# Patient Record
Sex: FEMALE | Race: WHITE | NOT HISPANIC OR LATINO
[De-identification: names, ages, dates, MRNs, and addresses within clinical notes are randomized per-mention and may not be internally consistent; named-entity substitution may affect disease eponyms.]

---

## 2017-05-19 ENCOUNTER — LABORATORY RESULT (OUTPATIENT)
Age: 57
End: 2017-05-19

## 2017-05-19 ENCOUNTER — APPOINTMENT (OUTPATIENT)
Dept: OBGYN | Facility: CLINIC | Age: 57
End: 2017-05-19

## 2017-05-19 VITALS
HEIGHT: 60 IN | DIASTOLIC BLOOD PRESSURE: 83 MMHG | SYSTOLIC BLOOD PRESSURE: 129 MMHG | BODY MASS INDEX: 56.15 KG/M2 | WEIGHT: 286 LBS

## 2017-05-29 ENCOUNTER — MOBILE ON CALL (OUTPATIENT)
Age: 57
End: 2017-05-29

## 2017-05-30 ENCOUNTER — MOBILE ON CALL (OUTPATIENT)
Age: 57
End: 2017-05-30

## 2017-05-31 ENCOUNTER — MESSAGE (OUTPATIENT)
Age: 57
End: 2017-05-31

## 2017-05-31 LAB — CYTOLOGY CVX/VAG DOC THIN PREP: NORMAL

## 2018-05-16 ENCOUNTER — APPOINTMENT (OUTPATIENT)
Dept: OBGYN | Facility: CLINIC | Age: 58
End: 2018-05-16
Payer: COMMERCIAL

## 2018-05-16 VITALS
SYSTOLIC BLOOD PRESSURE: 130 MMHG | BODY MASS INDEX: 50.85 KG/M2 | DIASTOLIC BLOOD PRESSURE: 84 MMHG | WEIGHT: 259 LBS | HEIGHT: 60 IN

## 2018-05-16 PROCEDURE — 99396 PREV VISIT EST AGE 40-64: CPT

## 2018-05-18 ENCOUNTER — MESSAGE (OUTPATIENT)
Age: 58
End: 2018-05-18

## 2018-05-18 LAB — HPV HIGH+LOW RISK DNA PNL CVX: NOT DETECTED

## 2018-05-21 ENCOUNTER — MESSAGE (OUTPATIENT)
Age: 58
End: 2018-05-21

## 2018-05-21 LAB — CYTOLOGY CVX/VAG DOC THIN PREP: NORMAL

## 2018-07-27 ENCOUNTER — MESSAGE (OUTPATIENT)
Age: 58
End: 2018-07-27

## 2018-07-27 RX ORDER — LEVOTHYROXINE SODIUM 0.12 MG/1
125 TABLET ORAL DAILY
Qty: 90 | Refills: 0 | Status: ACTIVE | COMMUNITY
Start: 2018-07-27 | End: 1900-01-01

## 2019-05-22 ENCOUNTER — APPOINTMENT (OUTPATIENT)
Dept: OBGYN | Facility: CLINIC | Age: 59
End: 2019-05-22
Payer: COMMERCIAL

## 2019-05-22 VITALS
BODY MASS INDEX: 48.1 KG/M2 | DIASTOLIC BLOOD PRESSURE: 82 MMHG | SYSTOLIC BLOOD PRESSURE: 130 MMHG | WEIGHT: 245 LBS | HEIGHT: 60 IN

## 2019-05-22 PROCEDURE — 82270 OCCULT BLOOD FECES: CPT

## 2019-05-22 PROCEDURE — 99396 PREV VISIT EST AGE 40-64: CPT

## 2019-05-22 NOTE — PHYSICAL EXAM
[Awake] : awake [Alert] : alert [Acute Distress] : no acute distress [Thyroid Nodule] : no thyroid nodule [Goiter] : no goiter [Mass] : no breast mass [Nipple Discharge] : no nipple discharge [Axillary LAD] : no axillary lymphadenopathy [Soft] : soft [Tender] : non tender [Oriented x3] : oriented to person, place, and time [Normal] : vagina [No Bleeding] : there was no active vaginal bleeding [Absent] : absent [Adnexa Absent] : absent bilaterally [No Tenderness] : no rectal tenderness [Occult Blood] : occult blood test from digital rectal exam was negative [Nl Sphincter Tone] : normal sphincter tone [RRR, No Murmurs] : RRR, no murmurs [CTAB] : CTAB

## 2019-05-24 ENCOUNTER — MESSAGE (OUTPATIENT)
Age: 59
End: 2019-05-24

## 2019-05-24 LAB — HPV HIGH+LOW RISK DNA PNL CVX: NOT DETECTED

## 2019-05-28 ENCOUNTER — MESSAGE (OUTPATIENT)
Age: 59
End: 2019-05-28

## 2019-05-28 ENCOUNTER — TRANSCRIPTION ENCOUNTER (OUTPATIENT)
Age: 59
End: 2019-05-28

## 2019-05-28 LAB — CYTOLOGY CVX/VAG DOC THIN PREP: NORMAL

## 2020-06-03 ENCOUNTER — APPOINTMENT (OUTPATIENT)
Dept: OBGYN | Facility: CLINIC | Age: 60
End: 2020-06-03
Payer: COMMERCIAL

## 2020-06-03 VITALS
WEIGHT: 276 LBS | BODY MASS INDEX: 54.19 KG/M2 | SYSTOLIC BLOOD PRESSURE: 132 MMHG | HEIGHT: 60 IN | DIASTOLIC BLOOD PRESSURE: 82 MMHG

## 2020-06-03 PROCEDURE — 99396 PREV VISIT EST AGE 40-64: CPT

## 2020-06-03 PROCEDURE — 82270 OCCULT BLOOD FECES: CPT

## 2020-06-03 NOTE — PHYSICAL EXAM
[Awake] : awake [Alert] : alert [Acute Distress] : no acute distress [Thyroid Nodule] : no thyroid nodule [Goiter] : no goiter [Mass] : no breast mass [Nipple Discharge] : no nipple discharge [Axillary LAD] : no axillary lymphadenopathy [Soft] : soft [Tender] : non tender [Oriented x3] : oriented to person, place, and time [Normal] : urethra [Atrophy] : atrophy [Dry Mucosa] : dry mucosa [Absent] : was absent [No Bleeding] : there was no active vaginal bleeding [Adnexa Absent] : absent bilaterally [Occult Blood] : occult blood test from digital rectal exam was negative [No Tenderness] : no rectal tenderness [Nl Sphincter Tone] : normal sphincter tone [External Hemorrhoid] : an external hemorrhoid

## 2020-06-05 ENCOUNTER — MESSAGE (OUTPATIENT)
Age: 60
End: 2020-06-05

## 2020-06-05 LAB
CYTOLOGY CVX/VAG DOC THIN PREP: NORMAL
HPV HIGH+LOW RISK DNA PNL CVX: NOT DETECTED

## 2020-08-08 DIAGNOSIS — Z80.0 FAMILY HISTORY OF MALIGNANT NEOPLASM OF DIGESTIVE ORGANS: ICD-10-CM

## 2020-08-08 DIAGNOSIS — E06.3 AUTOIMMUNE THYROIDITIS: ICD-10-CM

## 2020-08-08 DIAGNOSIS — I10 ESSENTIAL (PRIMARY) HYPERTENSION: ICD-10-CM

## 2020-08-08 DIAGNOSIS — Z90.89 ACQUIRED ABSENCE OF OTHER ORGANS: ICD-10-CM

## 2020-08-08 DIAGNOSIS — R05 COUGH: ICD-10-CM

## 2020-08-08 DIAGNOSIS — E03.2 HYPOTHYROIDISM DUE TO MEDICAMENTS AND OTHER EXOGENOUS SUBSTANCES: ICD-10-CM

## 2020-08-08 DIAGNOSIS — Z98.891 HISTORY OF UTERINE SCAR FROM PREVIOUS SURGERY: ICD-10-CM

## 2020-08-08 DIAGNOSIS — Z90.710 ACQUIRED ABSENCE OF BOTH CERVIX AND UTERUS: ICD-10-CM

## 2020-08-08 DIAGNOSIS — Z82.49 FAMILY HISTORY OF ISCHEMIC HEART DISEASE AND OTHER DISEASES OF THE CIRCULATORY SYSTEM: ICD-10-CM

## 2021-10-15 ENCOUNTER — NON-APPOINTMENT (OUTPATIENT)
Age: 61
End: 2021-10-15

## 2021-10-20 ENCOUNTER — NON-APPOINTMENT (OUTPATIENT)
Age: 61
End: 2021-10-20

## 2021-12-20 ENCOUNTER — APPOINTMENT (OUTPATIENT)
Dept: OBGYN | Facility: CLINIC | Age: 61
End: 2021-12-20

## 2022-02-12 ENCOUNTER — NON-APPOINTMENT (OUTPATIENT)
Age: 62
End: 2022-02-12

## 2022-02-15 ENCOUNTER — APPOINTMENT (OUTPATIENT)
Dept: OBGYN | Facility: CLINIC | Age: 62
End: 2022-02-15
Payer: COMMERCIAL

## 2022-02-15 VITALS
WEIGHT: 293 LBS | BODY MASS INDEX: 57.52 KG/M2 | DIASTOLIC BLOOD PRESSURE: 84 MMHG | SYSTOLIC BLOOD PRESSURE: 132 MMHG | HEIGHT: 60 IN

## 2022-02-15 DIAGNOSIS — Z12.11 ENCOUNTER FOR SCREENING FOR MALIGNANT NEOPLASM OF COLON: ICD-10-CM

## 2022-02-15 DIAGNOSIS — Z01.419 ENCOUNTER FOR GYNECOLOGICAL EXAMINATION (GENERAL) (ROUTINE) W/OUT ABNORMAL FINDINGS: ICD-10-CM

## 2022-02-15 DIAGNOSIS — Z12.12 ENCOUNTER FOR SCREENING FOR MALIGNANT NEOPLASM OF COLON: ICD-10-CM

## 2022-02-15 PROCEDURE — 99396 PREV VISIT EST AGE 40-64: CPT

## 2022-02-15 PROCEDURE — 82270 OCCULT BLOOD FECES: CPT

## 2022-02-15 NOTE — PHYSICAL EXAM
[Examination Of The Breasts] : a normal appearance [No Masses] : no breast masses were palpable [Labia Majora] : normal [Labia Minora] : normal [Normal] : normal [Absent] : absent [Uterine Adnexae] : absent [No Tenderness] : no tenderness [Nl Sphincter Tone] : normal sphincter tone [External Hemorrhoid] : external hemorrhoid [FreeTextEntry9] : stool HO neg

## 2022-02-15 NOTE — DISCUSSION/SUMMARY
[FreeTextEntry1] : 62yo without gyn complaint\par Plan:\par 1.Mammo\par 2.BMD\par 3.Colonosocpy per routine

## 2022-02-15 NOTE — HISTORY OF PRESENT ILLNESS
[FreeTextEntry1] : 60yo,  with gyn history significant for:\par 1.LH/BSO:Stage 1,Grade 1 endometrial ca\par LMP 2015

## 2022-05-16 ENCOUNTER — NON-APPOINTMENT (OUTPATIENT)
Age: 62
End: 2022-05-16

## 2022-05-16 ENCOUNTER — APPOINTMENT (OUTPATIENT)
Dept: ORTHOPEDIC SURGERY | Facility: CLINIC | Age: 62
End: 2022-05-16
Payer: COMMERCIAL

## 2022-05-16 VITALS
HEART RATE: 72 BPM | WEIGHT: 293 LBS | BODY MASS INDEX: 57.52 KG/M2 | HEIGHT: 60 IN | DIASTOLIC BLOOD PRESSURE: 101 MMHG | SYSTOLIC BLOOD PRESSURE: 183 MMHG

## 2022-05-16 PROCEDURE — 99203 OFFICE O/P NEW LOW 30 MIN: CPT

## 2022-05-16 RX ORDER — LEVOTHYROXINE SODIUM 0.11 MG/1
112 TABLET ORAL
Qty: 90 | Refills: 0 | Status: ACTIVE | COMMUNITY
Start: 2021-12-20

## 2022-08-08 ENCOUNTER — APPOINTMENT (OUTPATIENT)
Dept: ORTHOPEDIC SURGERY | Facility: CLINIC | Age: 62
End: 2022-08-08

## 2022-08-08 PROCEDURE — 99213 OFFICE O/P EST LOW 20 MIN: CPT

## 2022-08-08 RX ORDER — HYALURONATE SODIUM 20 MG/2 ML
20 SYRINGE (ML) INTRAARTICULAR
Qty: 2 | Refills: 0 | Status: ACTIVE | COMMUNITY
Start: 2022-08-08

## 2022-08-10 ENCOUNTER — RX RENEWAL (OUTPATIENT)
Age: 62
End: 2022-08-10

## 2022-08-22 ENCOUNTER — APPOINTMENT (OUTPATIENT)
Dept: ORTHOPEDIC SURGERY | Facility: CLINIC | Age: 62
End: 2022-08-22

## 2022-08-23 ENCOUNTER — APPOINTMENT (OUTPATIENT)
Dept: ORTHOPEDIC SURGERY | Facility: CLINIC | Age: 62
End: 2022-08-23

## 2022-08-23 PROCEDURE — 20610 DRAIN/INJ JOINT/BURSA W/O US: CPT | Mod: RT

## 2022-08-26 NOTE — HISTORY OF PRESENT ILLNESS
[de-identified] : Patient has a history of knee osteoarthritis. Patient has been under the care of Dr. Zimmerman (currently out of office) who discussed treatment options and  obtained insurance authorization to proceed with a series of hyaluronic acid injections and patient would like to proceed at this time. No significant interval change in knee pain since last evaluated.

## 2022-08-26 NOTE — DISCUSSION/SUMMARY
[de-identified] : Patient was seen today for continued management of chronic knee pain secondary to underlying osteoarthritis. This has been a chronic issue for the patient with recent atraumatic exacerbation. We discussed various treatment options as well as associated risk/benefits/alternatives and patient elected to proceed with hyaluronic acid injection therapy. \par The first of three Euflexxa injections was given today under sterile conditions into the Bilateral knee joints without complication (see procedure note). The patient was instructed on modification of activities over the next 48-72 hours. I advised the patient to ice the knee as needed for control of local irritation from the injection. I advised that some patients have immediate benefit from the initial injection therapy, however it usually takes the medication a number of weeks (~8wks) to provide significant relief of osteoarthritic symptoms. \par \par I  will see the patient back for the second injection in approximately 1 week.\par \par I work as part of an academic orthopedic group and routinely have a physician in training (resident / fellow) working with me.  Any part of the history and physical exam performed by the physician in training was either directly reviewed and/or replicated by myself.  Any procedure performed by the physician in training was performed under my direct supervision and with the consent of the patient.\par \par This note was generated using dragon medical dictation software. A reasonable effort has been made for proofreading its contents, but typos may still remain. If there are any questions or points of clarification needed please notify my office.

## 2022-08-26 NOTE — PROCEDURE
[de-identified] : Injection: Right knee joint.\par Indication: Osteoarthritis.\par \par A discussion was had with the patient regarding this procedure and all questions were answered. All risks, benefits and alternatives were discussed. These included but were not limited to bleeding, infection, and allergic reaction. A timeout was done to ensure correct side and patient agreed to the procedure.  A [place] leann was created on the skin utilizing a plastic needle cap to leann the anticipated point of entry. Alcohol was used to clean the skin, and Betadine was used to sterilize and prep the area in the lateral joint line aspect of the knee. Ethyl chloride spray was then used as a topical anesthetic. A 22-gauge needle was used to inject 2 cc of Euflexxa into the knee with ease. A sterile bandage was then applied. The patient tolerated the procedure well and there were no complications. \par \par Lot #:  [id number]\par Exp:  5/1/23\par \par _____________________________________\par \par Injection: Left knee joint.\par Indication: Osteoarthritis.\par \par A discussion was had with the patient regarding this procedure and all questions were answered. All risks, benefits and alternatives were discussed. These included but were not limited to bleeding, infection, and allergic reaction. A timeout was done to ensure correct side and patient agreed to the procedure.  A [place] leann was created on the skin utilizing a plastic needle cap to leann the anticipated point of entry. Alcohol was used to clean the skin, and Betadine was used to sterilize and prep the area in the lateral joint line aspect of the knee. Ethyl chloride spray was then used as a topical anesthetic. A 22-gauge needle was used to inject 2 cc of Euflexxa into the knee with ease. A sterile bandage was then applied. The patient tolerated the procedure well and there were no complications. \par \par Lot #:  Same as above\par Exp:  Same as above\par \par

## 2022-08-26 NOTE — PHYSICAL EXAM
[de-identified] : Constitutional: Well-nourished, well-developed, No acute distress\par Respiratory:  Good respiratory effort, no SOB\par Lymphatic: No regional lymphadenopathy, no lymphedema\par Psychiatric: Pleasant and normal affect, alert and oriented x3\par Skin: Clean dry and intact B/L LE\par Musculoskeletal: normal except where as noted in regional exam\par \par Left Knee:\par APPEARANCE: no marked deformities, no swelling or malalignment\par POSITIVE TENDERNESS:  + crepitus of the anterior knee, and tenderness of patellar retinaculum\par NONTENDER: jt lines b/l, patellar & quadriceps tendons, MCL/LCL, ITB at the lateral femoral condyle & Gerdy's tubercle, pes bursa. \par ROM: full & painless, although some discomfort in deep knee flexion\par RESISTIVE TESTING: + discomfort with knee ext from deep knee flexion (stretched position), painless knee flexion. \par SPECIAL TESTS: stable v/v stress. painless grind. neg Lachman's. neg ant/post drawer. neg Chau's. \par \par Right Knee:\par APPEARANCE: no marked deformities, no swelling or malalignment\par POSITIVE TENDERNESS:  + crepitus of the anterior knee, and tenderness of patellar retinaculum\par NONTENDER: jt lines b/l, patellar & quadriceps tendons, MCL/LCL, ITB at the lateral femoral condyle & Gerdy's tubercle, pes bursa. \par ROM: full & painless, although some discomfort in deep knee flexion\par RESISTIVE TESTING: + discomfort with knee ext from deep knee flexion (stretched position), painless knee flexion. \par SPECIAL TESTS: stable v/v stress. painless grind. neg Lachman's. neg ant/post drawer. neg Chau's. \par \par

## 2022-08-30 ENCOUNTER — APPOINTMENT (OUTPATIENT)
Dept: ORTHOPEDIC SURGERY | Facility: CLINIC | Age: 62
End: 2022-08-30

## 2022-08-30 PROCEDURE — 20610 DRAIN/INJ JOINT/BURSA W/O US: CPT | Mod: LT

## 2022-08-31 NOTE — PROCEDURE
[de-identified] : Injection: Right knee joint.\par Indication: Osteoarthritis.\par \par A discussion was had with the patient regarding this procedure and all questions were answered. All risks, benefits and alternatives were discussed. These included but were not limited to bleeding, infection, and allergic reaction. A timeout was done to ensure correct side and patient agreed to the procedure.  A [place] leann was created on the skin utilizing a plastic needle cap to leann the anticipated point of entry. Alcohol was used to clean the skin, and Betadine was used to sterilize and prep the area in the lateral joint line aspect of the knee. Ethyl chloride spray was then used as a topical anesthetic. A 22-gauge needle was used to inject 2 cc of Euflexxa into the knee with ease. A sterile bandage was then applied. The patient tolerated the procedure well and there were no complications. \par \par Lot #:  [id number]\par Exp:  5/1/23\par \par _____________________________________\par \par Injection: Left knee joint.\par Indication: Osteoarthritis.\par \par A discussion was had with the patient regarding this procedure and all questions were answered. All risks, benefits and alternatives were discussed. These included but were not limited to bleeding, infection, and allergic reaction. A timeout was done to ensure correct side and patient agreed to the procedure.  A [place] leann was created on the skin utilizing a plastic needle cap to leann the anticipated point of entry. Alcohol was used to clean the skin, and Betadine was used to sterilize and prep the area in the lateral joint line aspect of the knee. Ethyl chloride spray was then used as a topical anesthetic. A 22-gauge needle was used to inject 2 cc of Euflexxa into the knee with ease. A sterile bandage was then applied. The patient tolerated the procedure well and there were no complications. \par \par Lot #:  Same as above\par Exp:  Same as above\par \par

## 2022-08-31 NOTE — PHYSICAL EXAM
[de-identified] : Constitutional: Well-nourished, well-developed, No acute distress\par Respiratory: Good respiratory effort, no SOB\par Lymphatic: No regional lymphadenopathy, no lymphedema\par Psychiatric: Pleasant and normal affect, alert and oriented x3\par Skin: Clean dry and intact B/L LE\par Musculoskeletal: normal except where as noted in regional exam\par \par Left Knee:\par APPEARANCE: no marked deformities, no swelling or malalignment\par POSITIVE TENDERNESS: + crepitus of the anterior knee, and tenderness of patellar retinaculum\par NONTENDER: jt lines b/l, patellar & quadriceps tendons, MCL/LCL, ITB at the lateral femoral condyle & Gerdy's tubercle, pes bursa. \par ROM: full & painless, although some discomfort in deep knee flexion\par RESISTIVE TESTING: + discomfort with knee ext from deep knee flexion (stretched position), painless knee flexion. \par SPECIAL TESTS: stable v/v stress. painless grind. neg Lachman's. neg ant/post drawer. neg Chau's. \par \par Right Knee:\par APPEARANCE: no marked deformities, no swelling or malalignment\par POSITIVE TENDERNESS: + crepitus of the anterior knee, and tenderness of patellar retinaculum\par NONTENDER: jt lines b/l, patellar & quadriceps tendons, MCL/LCL, ITB at the lateral femoral condyle & Gerdy's tubercle, pes bursa. \par ROM: full & painless, although some discomfort in deep knee flexion\par RESISTIVE TESTING: + discomfort with knee ext from deep knee flexion (stretched position), painless knee flexion. \par SPECIAL TESTS: stable v/v stress. painless grind. neg Lachman's. neg ant/post drawer. neg Chau's. \par

## 2022-08-31 NOTE — HISTORY OF PRESENT ILLNESS
[de-identified] : Patient is here today to follow-up on knee pain.  There has been some mild improvement after the first hyaluronic acid injection performed at last visit.  No adverse reaction thus far.  Patient would like to proceed with the second injection in the series at this time.

## 2022-08-31 NOTE — DISCUSSION/SUMMARY
[de-identified] : The second of three Euflexxa injections was given today under sterile conditions into the bilateral knee joints without complication (see procedure note). I again discussed the role of activity modification/icing following the injection to treat any local irritation from the injection. \par \par I will have the patient followup for the final injection in approximately 1 week.\par \par I work as part of an academic orthopedic group and routinely have a physician in training (resident / fellow) working with me.  Any part of the history and physical exam performed by the physician in training was either directly reviewed and/or replicated by myself.  Any procedure performed by the physician in training was performed under my direct supervision and with the consent of the patient.\par \par This note was generated using dragon medical dictation software. A reasonable effort has been made for proofreading its contents, but typos may still remain. If there are any questions or points of clarification needed please notify my office.

## 2022-09-06 ENCOUNTER — APPOINTMENT (OUTPATIENT)
Dept: ORTHOPEDIC SURGERY | Facility: CLINIC | Age: 62
End: 2022-09-06

## 2022-09-06 PROCEDURE — 20610 DRAIN/INJ JOINT/BURSA W/O US: CPT | Mod: RT

## 2022-09-06 NOTE — PROCEDURE
[de-identified] : Injection: Right knee joint.\par Indication: Osteoarthritis.\par \par A discussion was had with the patient regarding this procedure and all questions were answered. All risks, benefits and alternatives were discussed. These included but were not limited to bleeding, infection, and allergic reaction. A timeout was done to ensure correct side and patient agreed to the procedure.  A [place] leann was created on the skin utilizing a plastic needle cap to leann the anticipated point of entry. Alcohol was used to clean the skin, and Betadine was used to sterilize and prep the area in the lateral joint line aspect of the knee. Ethyl chloride spray was then used as a topical anesthetic. A 22-gauge needle was used to inject 2 cc of Euflexxa into the knee with ease. A sterile bandage was then applied. The patient tolerated the procedure well and there were no complications. \par \par Lot #:  [id number]\par Exp:  5/1/23\par \par _____________________________________\par \par Injection: Left knee joint.\par Indication: Osteoarthritis.\par \par A discussion was had with the patient regarding this procedure and all questions were answered. All risks, benefits and alternatives were discussed. These included but were not limited to bleeding, infection, and allergic reaction. A timeout was done to ensure correct side and patient agreed to the procedure.  A [place] leann was created on the skin utilizing a plastic needle cap to leann the anticipated point of entry. Alcohol was used to clean the skin, and Betadine was used to sterilize and prep the area in the lateral joint line aspect of the knee. Ethyl chloride spray was then used as a topical anesthetic. A 22-gauge needle was used to inject 2 cc of Euflexxa into the knee with ease. A sterile bandage was then applied. The patient tolerated the procedure well and there were no complications. \par \par Lot #:  Same as above\par Exp:  Same as above\par \par

## 2022-09-06 NOTE — DISCUSSION/SUMMARY
[de-identified] : The final Euflexxa injection was given today under sterile conditions into the bilateral knee joints without complication (see procedure note). I discussed the effects of this medication and how long it may provide benefit. Patient has obtained moderate immediate improvement. If no significant long-term benefit, the patient may elect for additional treatment strategies as previously discussed. However, if the patient obtains good relief of symptoms, the injection therapy series can be repeated over the next 6-12 months.\par \par Will have the patient followup on an as-needed basis at this time.  Patient appreciates and agrees with current plan.\par \par I work as part of an academic orthopedic group and routinely have a physician in training (resident / fellow) working with me.  Any part of the history and physical exam performed by the physician in training was either directly reviewed and/or replicated by myself.  Any procedure performed by the physician in training was performed under my direct supervision and with the consent of the patient.\par \par This note was generated using dragon medical dictation software. A reasonable effort has been made for proofreading its contents, but typos may still remain. If there are any questions or points of clarification needed please notify my office.

## 2022-09-06 NOTE — PHYSICAL EXAM
[de-identified] : Constitutional: Well-nourished, well-developed, No acute distress\par Respiratory: Good respiratory effort, no SOB\par Lymphatic: No regional lymphadenopathy, no lymphedema\par Psychiatric: Pleasant and normal affect, alert and oriented x3\par Skin: Clean dry and intact B/L LE\par Musculoskeletal: normal except where as noted in regional exam\par \par Left Knee:\par APPEARANCE: no marked deformities, no swelling or malalignment\par POSITIVE TENDERNESS: + crepitus of the anterior knee, and tenderness of patellar retinaculum\par NONTENDER: jt lines b/l, patellar & quadriceps tendons, MCL/LCL, ITB at the lateral femoral condyle & Gerdy's tubercle, pes bursa. \par ROM: full & painless, although some discomfort in deep knee flexion\par RESISTIVE TESTING: + discomfort with knee ext from deep knee flexion (stretched position), painless knee flexion. \par SPECIAL TESTS: stable v/v stress. painless grind. neg Lachman's. neg ant/post drawer. neg Chau's. \par \par Right Knee:\par APPEARANCE: no marked deformities, no swelling or malalignment\par POSITIVE TENDERNESS: + crepitus of the anterior knee, and tenderness of patellar retinaculum\par NONTENDER: jt lines b/l, patellar & quadriceps tendons, MCL/LCL, ITB at the lateral femoral condyle & Gerdy's tubercle, pes bursa. \par ROM: full & painless, although some discomfort in deep knee flexion\par RESISTIVE TESTING: + discomfort with knee ext from deep knee flexion (stretched position), painless knee flexion. \par SPECIAL TESTS: stable v/v stress. painless grind. neg Lachman's. neg ant/post drawer. neg Chau's. \par

## 2022-09-06 NOTE — HISTORY OF PRESENT ILLNESS
[de-identified] : Patient is here today to follow-up on knee pain.  There has been some mild improvement after the second hyaluronic acid injection performed at last visit.  No adverse reaction thus far.  Patient would like to proceed with the last injection in the series at this time.

## 2022-09-12 ENCOUNTER — APPOINTMENT (OUTPATIENT)
Dept: ORTHOPEDIC SURGERY | Facility: CLINIC | Age: 62
End: 2022-09-12

## 2022-09-19 ENCOUNTER — APPOINTMENT (OUTPATIENT)
Dept: ORTHOPEDIC SURGERY | Facility: CLINIC | Age: 62
End: 2022-09-19

## 2022-09-26 ENCOUNTER — APPOINTMENT (OUTPATIENT)
Dept: ORTHOPEDIC SURGERY | Facility: CLINIC | Age: 62
End: 2022-09-26

## 2022-10-17 ENCOUNTER — APPOINTMENT (OUTPATIENT)
Dept: ORTHOPEDIC SURGERY | Facility: CLINIC | Age: 62
End: 2022-10-17

## 2022-10-17 VITALS — BODY MASS INDEX: 57.52 KG/M2 | HEIGHT: 60 IN | WEIGHT: 293 LBS

## 2022-10-17 PROCEDURE — 99213 OFFICE O/P EST LOW 20 MIN: CPT

## 2022-10-17 NOTE — DISCUSSION/SUMMARY
[de-identified] : Patient was seen today for reevaluation of chronic intermittent bilateral knee pain with recent atraumatic exacerbation.  Patient had limited relief from hyaluronic acid injections provided approximately 1 month ago.  She is actually having increased right knee pain and pain with ADLs.  With the persistence and chronicity of the patient's pain I recommend we proceed with MRI of the right knee.  She is advised that she may have too far advanced osteoarthritis to undergo any arthroscopic intervention, at this point she may be a candidate for total knee replacement.  MRI will help elucidate if she has any potential arthroscopic options, or if she needs to proceed with knee replacement surgery as her only surgical option.  Advised the patient we can consider cortisone injections for some temporary short-term pain relief, and we can potentially trial a course of physical therapy.  We will wait and see what MRI of the right knee findings show and determine best next step in her management at that time.  Patient appreciates and agrees with current plan.\par \par I work as part of an academic orthopedic group and routinely have a physician in training (resident / fellow) working with me.  Any part of the history and physical exam performed by the physician in training was either directly reviewed and/or replicated by myself.  Any procedure performed by the physician in training was performed under my direct supervision and with the consent of the patient.\par \par This note was generated using dragon medical dictation software.  A reasonable effort has been made for proofreading its contents, but typos may still remain.  If there are any questions or points of clarification needed please notify my office.

## 2022-10-17 NOTE — PHYSICAL EXAM
[de-identified] : Constitutional: Well-nourished, well-developed, No acute distress\par Respiratory:  Good respiratory effort, no SOB\par Lymphatic: No regional lymphadenopathy, no lymphedema\par Psychiatric: Pleasant and normal affect, alert and oriented x3\par Skin: Clean dry and intact B/L LE\par Musculoskeletal: normal except where as noted in regional exam\par \par Left Knee:\par APPEARANCE: no marked deformities, no swelling or malalignment\par POSITIVE TENDERNESS:  + crepitus of the anterior knee, and tenderness of patellar retinaculum\par NONTENDER: jt lines b/l, patellar & quadriceps tendons, MCL/LCL, ITB at the lateral femoral condyle & Gerdy's tubercle, pes bursa. \par ROM: full & painless, although some discomfort in deep knee flexion\par RESISTIVE TESTING: + discomfort with knee ext from deep knee flexion (stretched position), painless knee flexion. \par SPECIAL TESTS: stable v/v stress. painless grind. neg Lachman's. neg ant/post drawer. neg Chau's. \par \par Right Knee:\par APPEARANCE: no marked deformities, no swelling or malalignment\par POSITIVE TENDERNESS:  + crepitus of the anterior knee, and tenderness of patellar retinaculum\par NONTENDER: jt lines b/l, patellar & quadriceps tendons, MCL/LCL, ITB at the lateral femoral condyle & Gerdy's tubercle, pes bursa. \par ROM: full & painless, although some discomfort in deep knee flexion\par RESISTIVE TESTING: + discomfort with knee ext from deep knee flexion (stretched position), painless knee flexion. \par SPECIAL TESTS: stable v/v stress. painless grind. neg Lachman's. neg ant/post drawer. neg Chau's. \par

## 2022-10-17 NOTE — HISTORY OF PRESENT ILLNESS
[de-identified] : Patient is here for b/l knee pain follow up. She finished the Euflexxa injections about a month ago. She feels that her right knee has worsened. She states that it is affecting her ambulation. She had a fall last week and this is her first time seeing someone. She bought a brace.

## 2022-10-27 ENCOUNTER — NON-APPOINTMENT (OUTPATIENT)
Age: 62
End: 2022-10-27

## 2022-10-28 ENCOUNTER — APPOINTMENT (OUTPATIENT)
Dept: ORTHOPEDIC SURGERY | Facility: CLINIC | Age: 62
End: 2022-10-28
Payer: COMMERCIAL

## 2022-10-28 DIAGNOSIS — M17.12 UNILATERAL PRIMARY OSTEOARTHRITIS, LEFT KNEE: ICD-10-CM

## 2022-10-28 PROCEDURE — 73562 X-RAY EXAM OF KNEE 3: CPT | Mod: RT

## 2022-10-28 PROCEDURE — 99214 OFFICE O/P EST MOD 30 MIN: CPT

## 2022-10-28 NOTE — HISTORY OF PRESENT ILLNESS
[Right Leg] : right leg [Gradual] : gradual [Sudden] : sudden [5] : 5 [4] : 4 [Burning] : burning [Shooting] : shooting [Stabbing] : stabbing [Intermittent] : intermittent [Rest] : rest [Exercising] : exercising [Euflexxa] : Euflexxa [de-identified] : 10/28/22:  Return visit for a 62 year old female  w/ hx of OA both knees, Rt>>lt, c/o constant and daily.No rest pain. Worse walking. cannot walk 1-2 blocks w/o pain., Difficulty stairclimbing. uses a cane prn long walks. Finished her last series of gel injections x 1 month ago by another ortho MD w/o relief. Taking Mobic w/o relief.\par \par IMPRESSION:  10/20/22\par 1.  Motion limited study.\par 2.  Severe medial knee osteoarthritis with bone-on-bone apposition. Complex degeneration and tearing of the medial meniscus. Partial lateral meniscus tear.\par Very small effusion and popliteal cyst.\par \par 10/29/21: Initial visit for a 61 year old female here today for bilateral knee pain, Lt >> rt that began 5 days duration. No hx of trauma. Slight limp. Constant and daily. Tried aleve 2 pills prn w/ little relief.\par PMH: has had some similar pain x years ago [] : Post Surgical Visit: no [FreeTextEntry1] : right knee  [de-identified] : xrays mris  [de-identified] : pt had a series of euflexxa with an outside ortho since last visit  [de-identified] : 9/6/22

## 2022-10-28 NOTE — PHYSICAL EXAM
[Normal Mood and Affect] : normal mood and affect [Orientated] : orientated [Able to Communicate] : able to communicate [Well Developed] : well developed [Well Nourished] : well nourished [Left] : left knee [NL (0)] : extension 0 degrees [5___] : hamstring 5[unfilled]/5 [Right] : right knee [AP] : anteroposterior [Lateral] : lateral [[place]] : skyline [advanced tricompartmental OA with medial compartment narrowing and varus alignment] : advanced tricompartmental OA with medial compartment narrowing and varus alignment [de-identified] : Obesity [] : negative Lachmann [FreeTextEntry9] : Both knees [TWNoteComboBox7] : flexion 130 degrees

## 2022-11-04 ENCOUNTER — NON-APPOINTMENT (OUTPATIENT)
Age: 62
End: 2022-11-04

## 2022-11-04 ENCOUNTER — APPOINTMENT (OUTPATIENT)
Dept: ORTHOPEDIC SURGERY | Facility: CLINIC | Age: 62
End: 2022-11-04

## 2022-11-04 VITALS — HEIGHT: 60 IN | WEIGHT: 293 LBS | OXYGEN SATURATION: 97 % | BODY MASS INDEX: 57.52 KG/M2 | TEMPERATURE: 98.3 F

## 2022-11-04 VITALS — HEART RATE: 93 BPM | SYSTOLIC BLOOD PRESSURE: 184 MMHG | DIASTOLIC BLOOD PRESSURE: 94 MMHG

## 2022-11-04 DIAGNOSIS — M17.11 UNILATERAL PRIMARY OSTEOARTHRITIS, RIGHT KNEE: ICD-10-CM

## 2022-11-04 PROCEDURE — 99213 OFFICE O/P EST LOW 20 MIN: CPT

## 2022-11-04 NOTE — REVIEW OF SYSTEMS
[Arthralgia] : arthralgia [Joint Pain] : joint pain [Joint Stiffness] : joint stiffness [Joint Swelling] : joint swelling [Fever] : no fever [Chills] : no chills

## 2022-11-04 NOTE — DISCUSSION/SUMMARY
[de-identified] : Pt is a pleasant 62 year old female with chronic right  knee pain secondary to underlying osteoarthritis which has been refractory to multimodal conservative therapies. A lengthy discussion was held regarding the patients condition and treatment options including all risks, benefits, prognosis and outcomes of each were discussed in detail. She has failed extensive conservative measures and ultimately would benefit from a TKR, however given her current BMI she is not considered a safe candidate and is at increased risk for perioperative complications. A large emphasis was placed on weight reduction as this is her primary hindrance to having a TKR performed. Currently she does not follow with a primary care physician. She was advised that she would need to establish care prior to having any surgical procedures performed and they may assist her with weight reduction. A list of names were provided. It was also discussed the possibility of having her see a weight-reduction specialist. A list of providers were given. Lastly, she may consider seeing Dr. Garcia who performs high risk TKR. Cortisone injection was deferred until she sees Dr. Garcia as we would not want to delay possible surgery within the next 3 months. The patient is anticipated to begin care with Dr. Garcia, however she may follow-up as needed after sufficient attempts at weight loss have been achieved. The patient will contact me if there are any concerns. The patient expressed understanding and all questions were answered.\par

## 2022-11-04 NOTE — HISTORY OF PRESENT ILLNESS
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 She denies any injury or trauma. She was initially seen by Dr. Jatin Awad and was treated with cortisone injection. After failing cortisone treatments and physical therapy she was then seen by Dr. Zimmerman and Dr. Leal who treated her with a series of HA injections (last dose completed 9/6/22) but this has failed to provide her any relief. She was subsequently sent to obtain an MRI and is her to discuss her options. She has no pain at rest but increases to a 9/10 during activity. She takes meloxicam and aleve to limit her pain which is intermittently efficacious. She is only able to ambulate a block before she has to sit down. She denies any numbness or tingling. \par \par Hx: Hysterectomy over 10 years ago.

## 2022-11-04 NOTE — PHYSICAL EXAM
[de-identified] : Pt is a pleasant 62 year old female in NAD and AAOx3. 58 BMI. Physical examination of both knees reveals normal contours, skin intact with no signs of infection, no erythema, no swelling, no effusion, no distal lymphedema or phlebitis, no patholaxity. ROM of the right knee knee reveals 0°-115° Strength is 5/5 within this arc of motion. There is moderate pain on palpation to the medial joint line. Patient noted to have normal alignment. There are no neurological deficits. 1+ DP/PT pulse. \par  [de-identified] : Previous x-rays (10/28/22) were interpreted by me today: 4 views of the right knee demonstrate severe medial and patellofemoral compartment OA without evidence of fracture or dislocation. \par \par MRI R Knee ([facility], 10/26/22) were independently reviewed by me today which is significant for severe medial compartment cartilage degeneration with complex degenerative medial meniscus tearing.

## 2022-11-04 NOTE — REASON FOR VISIT
[Initial Visit] : an initial visit for [FreeTextEntry2] : Right knee pain. referred by Dr. Jatin Karimi

## 2022-11-09 ENCOUNTER — RX RENEWAL (OUTPATIENT)
Age: 62
End: 2022-11-09

## 2022-11-09 RX ORDER — MELOXICAM 15 MG/1
15 TABLET ORAL
Qty: 30 | Refills: 2 | Status: ACTIVE | COMMUNITY
Start: 2022-05-16 | End: 1900-01-01

## 2022-11-17 ENCOUNTER — APPOINTMENT (OUTPATIENT)
Dept: ORTHOPEDIC SURGERY | Facility: CLINIC | Age: 62
End: 2022-11-17

## 2022-11-17 ENCOUNTER — APPOINTMENT (OUTPATIENT)
Dept: INTERNAL MEDICINE | Facility: CLINIC | Age: 62
End: 2022-11-17

## 2022-11-18 ENCOUNTER — APPOINTMENT (OUTPATIENT)
Dept: ORTHOPEDIC SURGERY | Facility: CLINIC | Age: 62
End: 2022-11-18

## 2022-12-02 ENCOUNTER — APPOINTMENT (OUTPATIENT)
Dept: ORTHOPEDIC SURGERY | Facility: CLINIC | Age: 62
End: 2022-12-02
Payer: COMMERCIAL

## 2022-12-02 VITALS — WEIGHT: 293 LBS | BODY MASS INDEX: 57.52 KG/M2 | HEIGHT: 60 IN

## 2022-12-02 PROCEDURE — 99214 OFFICE O/P EST MOD 30 MIN: CPT | Mod: 25

## 2022-12-02 PROCEDURE — 20610 DRAIN/INJ JOINT/BURSA W/O US: CPT | Mod: 50

## 2022-12-02 NOTE — HISTORY OF PRESENT ILLNESS
[de-identified] : This is very nice 62-year-old female experiencing bilateral knee pain, which is severe in intensity.  The right knee is more painful than the left.  He is she is here for second opinion.  She was referred by Dr. Georges.  He had diagnosed with bilateral knee osteoarthritis.  She has tried Mobic which does help.  Weight loss has not been tried.  She tried physical therapy which did not help.  She did try 1 round of hyaluronic acid injections which helped in the left knee only mildly.  Ambulates with a cane.  The patient denies any radiation of the pain to the feet and it is not associated with numbness, tingling, or weakness.  BMI is 58

## 2022-12-02 NOTE — PHYSICAL EXAM
[de-identified] : Patient is well nourished, well-developed, in no acute distress, with appropriate mood and affect. The patient is oriented to time, place, and person. Respirations are even and unlabored. Gait evaluation does reveal a limp. There is no inguinal adenopathy. Bilateral limbs are well-perfused, without skin lesions, shows a grossly normal motor and sensory examination. The right knee motion is significantly reduced and does cause significant pain. The right knee moves from 0 to 125 degrees. The knee is stable within that range-of-motion to AP and ML stress. The alignment of the knee is 5 degrees varus. Muscle strength is normal. Pedal pulses are palpable. Hip examination was negative. The left knee motion is significantly reduced and does cause significant pain. The left knee moves from 0 to 125 degrees. The knee is stable within that range-of-motion to AP and ML stress. The alignment of the knee is 5 degrees varus. Muscle strength is normal. Pedal pulses are palpable. Hip examination was negative. [de-identified] : Long standing knee, AP knee, lateral knee, and patellar views of the bilateral knee were brought in by the patient which I reviewed and demonstrate severe medial compartment degenerative joint disease of the knee with joint space narrowing, osteophyte formation, and subchondral sclerosis.

## 2022-12-02 NOTE — DISCUSSION/SUMMARY
[de-identified] : This patient is severe bilateral knee osteoarthritis.  The patient is not an appropriate candidate for surgical intervention at this time as her BMI is over our cutoff of 40 making surgery unsafe to proceed. An extensive discussion was conducted on the natural history of the disease and the variety of surgical and non-surgical options available to the patient including, but not limited to non-steroidal anti-inflammatory medications, steroid injections, physical therapy, maintenance of ideal body weight, and reduction of activity.  The patient has been counseled regarding the elevated risks associated with surgical complications in patients with a BMI>35.  I explained to the patient the risk of obesity, which is well documented in the orthopedic literature as increasing risks of wound breakdown (dehiscence), drainage, periprosthetic joint infection, dissatisfaction, chronic pain, early failure and/or loosening of the prosthesis, and impaired overall outcome to the patient. The patient demonstrates a profound understanding of the increased risk. Nutritionist referral, methods of monitoring nutrition intake and calorie counting and bariatric surgery options were discussed with the patient. After a lengthy discussion, the patient agreed to make a coordinated effort at weight loss. The patient understands our BMI policy and if they are planning surgical intervention, then they will need to bring their BMI below 40 in order to proceed and they are agreeable to this.  She should continue with the Mobic.  She does not need a new prescription for that today.  Today we performed bilateral knee intra-articular cortisone injections.\par The patient will schedule an appointment as needed.\par \par Informed consent for bilateral knee injections was obtained. All questions were answered. A time out was performed. The bilateral knees were prepped and draped in sterile fashion. Using sterile technique, the bilateral knees were each injected with 40mg of Kenalog, 4cc of 1% lidocaine, 4cc of 0.25% marcaine using a 21-gauge needle. A sterile dressing was applied. Post injection instructions were reviewed. The patient tolerated the procedure well.\par

## 2023-02-08 ENCOUNTER — RX RENEWAL (OUTPATIENT)
Age: 63
End: 2023-02-08

## 2023-02-24 ENCOUNTER — APPOINTMENT (OUTPATIENT)
Dept: ORTHOPEDIC SURGERY | Facility: CLINIC | Age: 63
End: 2023-02-24
Payer: COMMERCIAL

## 2023-02-24 PROCEDURE — 99214 OFFICE O/P EST MOD 30 MIN: CPT | Mod: 25

## 2023-02-24 PROCEDURE — 20610 DRAIN/INJ JOINT/BURSA W/O US: CPT | Mod: 50

## 2023-02-24 NOTE — PHYSICAL EXAM
[de-identified] : Patient is well nourished, well-developed, in no acute distress, with appropriate mood and affect. The patient is oriented to time, place, and person. Respirations are even and unlabored. Gait evaluation does reveal a limp. There is no inguinal adenopathy. Bilateral limbs are well-perfused, without skin lesions, shows a grossly normal motor and sensory examination. The right knee motion is significantly reduced and does cause significant pain. The right knee moves from 0 to 125 degrees. The knee is stable within that range-of-motion to AP and ML stress. The alignment of the knee is 5 degrees varus. Muscle strength is normal. Pedal pulses are palpable. Hip examination was negative. The left knee motion is significantly reduced and does cause significant pain. The left knee moves from 0 to 125 degrees. The knee is stable within that range-of-motion to AP and ML stress. The alignment of the knee is 5 degrees varus. Muscle strength is normal. Pedal pulses are palpable. Hip examination was negative. [de-identified] : Long standing knee, AP knee, lateral knee, and patellar views of the bilateral knee were brought in by the patient which I reviewed and demonstrate severe medial compartment degenerative joint disease of the knee with joint space narrowing, osteophyte formation, and subchondral sclerosis.

## 2023-02-24 NOTE — HISTORY OF PRESENT ILLNESS
[de-identified] : This is very nice 62-year-old female experiencing bilateral knee pain, which is severe in intensity.  I have previously diagnosed with bilateral knee osteoarthritis.  The right knee is more painful than the left.  She has tried Mobic which does help.  Weight loss has not been tried.  She tried physical therapy which did not help.  She did try 1 round of hyaluronic acid injections which helped in the left knee only mildly.  Ambulates with a cane.  The patient denies any radiation of the pain to the feet and it is not associated with numbness, tingling, or weakness.  BMI is 58

## 2023-02-24 NOTE — DISCUSSION/SUMMARY
[de-identified] : This patient is severe bilateral knee osteoarthritis.  The patient is not an appropriate candidate for surgical intervention at this time as her BMI is over our cutoff of 40 making surgery unsafe to proceed. An extensive discussion was conducted on the natural history of the disease and the variety of surgical and non-surgical options available to the patient including, but not limited to non-steroidal anti-inflammatory medications, steroid injections, physical therapy, maintenance of ideal body weight, and reduction of activity.  The patient has been counseled regarding the elevated risks associated with surgical complications in patients with a BMI>35.  I explained to the patient the risk of obesity, which is well documented in the orthopedic literature as increasing risks of wound breakdown (dehiscence), drainage, periprosthetic joint infection, dissatisfaction, chronic pain, early failure and/or loosening of the prosthesis, and impaired overall outcome to the patient. The patient demonstrates a profound understanding of the increased risk. Nutritionist referral, methods of monitoring nutrition intake and calorie counting and bariatric surgery options were discussed with the patient. After a lengthy discussion, the patient agreed to make a coordinated effort at weight loss. The patient understands our BMI policy and if they are planning surgical intervention, then they will need to bring their BMI below 40 in order to proceed and they are agreeable to this.  She should continue with the Mobic.  She does not need a new prescription for that today.  Today we performed bilateral knee intra-articular cortisone injections. The patient will schedule an appointment as needed in 3 months. Celebrex rx'd.\par \par Informed consent for bilateral knee injections was obtained. All questions were answered. A time out was performed. The bilateral knees were prepped and draped in sterile fashion. Using sterile technique, the bilateral knees were each injected with 40mg of Kenalog, 4cc of 1% lidocaine, 4cc of 0.25% marcaine using a 21-gauge needle. A sterile dressing was applied. Post injection instructions were reviewed. The patient tolerated the procedure well.\par  s needed.

## 2023-04-21 ENCOUNTER — RX RENEWAL (OUTPATIENT)
Age: 63
End: 2023-04-21

## 2023-05-25 ENCOUNTER — RX RENEWAL (OUTPATIENT)
Age: 63
End: 2023-05-25

## 2023-05-26 ENCOUNTER — APPOINTMENT (OUTPATIENT)
Dept: ORTHOPEDIC SURGERY | Facility: CLINIC | Age: 63
End: 2023-05-26
Payer: COMMERCIAL

## 2023-05-26 PROCEDURE — 20610 DRAIN/INJ JOINT/BURSA W/O US: CPT | Mod: 50

## 2023-05-26 NOTE — PROCEDURE
[de-identified] : Informed consent for bilat knee injections was obtained. All questions were answered. A time out was performed. The  knees were prepped and draped in sterile fashion. Using sterile technique, 40mg of Kenalog, 4cc of 1% lidocaine, 4cc of 0.25% marcaine using a 21-gauge needle. A sterile dressing was applied. Post injection instructions were reviewed. The patient tolerated the procedure well.

## 2023-08-25 ENCOUNTER — APPOINTMENT (OUTPATIENT)
Dept: ORTHOPEDIC SURGERY | Facility: CLINIC | Age: 63
End: 2023-08-25
Payer: COMMERCIAL

## 2023-08-25 VITALS — HEIGHT: 60 IN | WEIGHT: 280 LBS | BODY MASS INDEX: 54.97 KG/M2

## 2023-08-25 PROCEDURE — 20610 DRAIN/INJ JOINT/BURSA W/O US: CPT | Mod: 50

## 2023-08-25 PROCEDURE — 99214 OFFICE O/P EST MOD 30 MIN: CPT | Mod: 25

## 2023-08-25 NOTE — HISTORY OF PRESENT ILLNESS
[de-identified] : This is very nice 63-year-old female experiencing bilateral knee pain, which is severe in intensity.  I have previously diagnosed with bilateral knee osteoarthritis.  Cortisone in the past has helped. The right knee is more painful than the left.  She has tried Mobic which does help.  Weight loss has not been tried.  She tried physical therapy which did not help.  She did try 1 round of hyaluronic acid injections which helped in the left knee only mildly.  Ambulates with a cane.  The patient denies any radiation of the pain to the feet and it is not associated with numbness, tingling, or weakness.  BMI is 58

## 2023-08-25 NOTE — PHYSICAL EXAM
[de-identified] : Patient is well nourished, well-developed, in no acute distress, with appropriate mood and affect. The patient is oriented to time, place, and person. Respirations are even and unlabored. Gait evaluation does reveal a limp. There is no inguinal adenopathy. Bilateral limbs are well-perfused, without skin lesions, shows a grossly normal motor and sensory examination. The right knee motion is significantly reduced and does cause significant pain. The right knee moves from 0 to 125 degrees. The knee is stable within that range-of-motion to AP and ML stress. The alignment of the knee is 5 degrees varus. Muscle strength is normal. Pedal pulses are palpable. Hip examination was negative. The left knee motion is significantly reduced and does cause significant pain. The left knee moves from 0 to 125 degrees. The knee is stable within that range-of-motion to AP and ML stress. The alignment of the knee is 5 degrees varus. Muscle strength is normal. Pedal pulses are palpable. Hip examination was negative.

## 2023-08-25 NOTE — DISCUSSION/SUMMARY
[de-identified] : This patient is severe bilateral knee osteoarthritis.  The patient is not an appropriate candidate for surgical intervention at this time as her BMI is over our cutoff of 40 making surgery unsafe to proceed. An extensive discussion was conducted on the natural history of the disease and the variety of surgical and non-surgical options available to the patient including, but not limited to non-steroidal anti-inflammatory medications, steroid injections, physical therapy, maintenance of ideal body weight, and reduction of activity.  The patient has been counseled regarding the elevated risks associated with surgical complications in patients with a BMI>35.  I explained to the patient the risk of obesity, which is well documented in the orthopedic literature as increasing risks of wound breakdown (dehiscence), drainage, periprosthetic joint infection, dissatisfaction, chronic pain, early failure and/or loosening of the prosthesis, and impaired overall outcome to the patient. The patient demonstrates a profound understanding of the increased risk. Nutritionist referral, methods of monitoring nutrition intake and calorie counting and bariatric surgery options were discussed with the patient. After a lengthy discussion, the patient agreed to make a coordinated effort at weight loss. The patient understands our BMI policy and if they are planning surgical intervention, then they will need to bring their BMI below 40 in order to proceed and they are agreeable to this.  She should continue with the Mobic.  She does not need a new prescription for that today.  Today we performed bilateral knee intra-articular cortisone injections. The patient will schedule an appointment as needed in 3 months.   Informed consent for bilateral knee injections was obtained. All questions were answered. A time out was performed. The bilateral knees were prepped and draped in sterile fashion. Using sterile technique, the bilateral knees were each injected with 40mg of Kenalog, 4cc of 1% lidocaine, 4cc of 0.25% marcaine using a 21-gauge needle. A sterile dressing was applied. Post injection instructions were reviewed. The patient tolerated the procedure well.  s needed.

## 2023-09-07 ENCOUNTER — RX RENEWAL (OUTPATIENT)
Age: 63
End: 2023-09-07

## 2023-11-28 ENCOUNTER — APPOINTMENT (OUTPATIENT)
Dept: ORTHOPEDIC SURGERY | Facility: CLINIC | Age: 63
End: 2023-11-28
Payer: COMMERCIAL

## 2023-11-28 VITALS — BODY MASS INDEX: 54.58 KG/M2 | HEIGHT: 60 IN | WEIGHT: 278 LBS

## 2023-11-28 PROCEDURE — 99214 OFFICE O/P EST MOD 30 MIN: CPT | Mod: 25

## 2023-11-28 PROCEDURE — 20610 DRAIN/INJ JOINT/BURSA W/O US: CPT | Mod: 50

## 2023-12-06 ENCOUNTER — RX RENEWAL (OUTPATIENT)
Age: 63
End: 2023-12-06

## 2024-03-08 ENCOUNTER — APPOINTMENT (OUTPATIENT)
Dept: ORTHOPEDIC SURGERY | Facility: CLINIC | Age: 64
End: 2024-03-08
Payer: COMMERCIAL

## 2024-03-08 VITALS
SYSTOLIC BLOOD PRESSURE: 180 MMHG | HEART RATE: 88 BPM | BODY MASS INDEX: 55.36 KG/M2 | HEIGHT: 60 IN | DIASTOLIC BLOOD PRESSURE: 92 MMHG | WEIGHT: 282 LBS

## 2024-03-08 PROCEDURE — 73564 X-RAY EXAM KNEE 4 OR MORE: CPT | Mod: 50

## 2024-03-08 PROCEDURE — 20610 DRAIN/INJ JOINT/BURSA W/O US: CPT | Mod: 50

## 2024-03-08 PROCEDURE — 99214 OFFICE O/P EST MOD 30 MIN: CPT | Mod: 25

## 2024-03-08 NOTE — DISCUSSION/SUMMARY
[de-identified] : This patient is severe bilateral knee osteoarthritis.  The patient is not an appropriate candidate for surgical intervention at this time as her BMI is over our cutoff of 40 making surgery unsafe to proceed. An extensive discussion was conducted on the natural history of the disease and the variety of surgical and non-surgical options available to the patient including, but not limited to non-steroidal anti-inflammatory medications, steroid injections, physical therapy, maintenance of ideal body weight, and reduction of activity.  The patient has been counseled regarding the elevated risks associated with surgical complications in patients with a BMI>35.  I explained to the patient the risk of obesity, which is well documented in the orthopedic literature as increasing risks of wound breakdown (dehiscence), drainage, periprosthetic joint infection, dissatisfaction, chronic pain, early failure and/or loosening of the prosthesis, and impaired overall outcome to the patient. The patient demonstrates a profound understanding of the increased risk. Nutritionist referral, methods of monitoring nutrition intake and calorie counting and bariatric surgery options were discussed with the patient. After a lengthy discussion, the patient agreed to make a coordinated effort at weight loss. The patient understands our BMI policy and if they are planning surgical intervention, then they will need to bring their BMI below 40 in order to proceed and they are agreeable to this.  She should continue with the Mobic.  She does not need a new prescription for that today.  Today we performed bilateral knee intra-articular cortisone injections. The patient will schedule an appointment as needed in 3 months.   Informed consent for bilateral knee injections was obtained. All risks, benefits and alternatives were discussed. These included but were not limited to bleeding, infection, and allergic reaction.  All questions were answered. A time out was performed. The bilateral knees were prepped and draped in sterile fashion. Using sterile technique, the bilateral knees were each injected with 40mg of Kenalog, 4cc of 1% lidocaine, 4cc of 0.25% marcaine using a 21-gauge needle. A sterile dressing was applied. Post injection instructions were reviewed. The patient tolerated the procedure well.

## 2024-03-08 NOTE — PHYSICAL EXAM
[de-identified] : Patient is well nourished, well-developed, in no acute distress, with appropriate mood and affect. The patient is oriented to time, place, and person. Respirations are even and unlabored. Gait evaluation does reveal a limp. There is no inguinal adenopathy. Bilateral limbs are well-perfused, without skin lesions, shows a grossly normal motor and sensory examination. The right knee motion is significantly reduced and does cause significant pain. The right knee moves from 0 to 125 degrees. The knee is stable within that range-of-motion to AP and ML stress. The alignment of the knee is 5 degrees varus. Muscle strength is normal. Pedal pulses are palpable. Hip examination was negative. The left knee motion is significantly reduced and does cause significant pain. The left knee moves from 0 to 125 degrees. The knee is stable within that range-of-motion to AP and ML stress. The alignment of the knee is 5 degrees varus. Muscle strength is normal. Pedal pulses are palpable. Hip examination was negative. [de-identified] :  Long standing  knee, AP knee, lateral knee, tunnel and patellar views of the both knees were ordered and taken in the office and demonstrate degenerative joint disease of the knee with joint space narrowing, osteophyte formation, and subchondral sclerosis.

## 2024-03-08 NOTE — HISTORY OF PRESENT ILLNESS
[de-identified] : This is very nice 63-year-old female experiencing bilateral knee pain, which is severe in intensity.  I have previously diagnosed with bilateral knee osteoarthritis.  Cortisone in the past has helped. The right knee is more painful than the left.  She has tried Mobic which does help.  Weight loss has not been tried.  She tried physical therapy which did not help.  She did try 1 round of hyaluronic acid injections which helped in the left knee only mildly.  Ambulates with a cane.  The patient denies any radiation of the pain to the feet and it is not associated with numbness, tingling, or weakness.  BMI is 58

## 2024-03-11 ENCOUNTER — TRANSCRIPTION ENCOUNTER (OUTPATIENT)
Age: 64
End: 2024-03-11

## 2024-06-21 ENCOUNTER — APPOINTMENT (OUTPATIENT)
Dept: ORTHOPEDIC SURGERY | Facility: CLINIC | Age: 64
End: 2024-06-21
Payer: COMMERCIAL

## 2024-06-21 VITALS — HEIGHT: 60 IN | WEIGHT: 282 LBS | BODY MASS INDEX: 55.36 KG/M2

## 2024-06-21 VITALS — WEIGHT: 265 LBS | BODY MASS INDEX: 51.75 KG/M2

## 2024-06-21 DIAGNOSIS — M17.0 BILATERAL PRIMARY OSTEOARTHRITIS OF KNEE: ICD-10-CM

## 2024-06-21 PROCEDURE — 20610 DRAIN/INJ JOINT/BURSA W/O US: CPT | Mod: 50

## 2024-06-21 PROCEDURE — 99214 OFFICE O/P EST MOD 30 MIN: CPT | Mod: 25

## 2024-06-21 NOTE — DISCUSSION/SUMMARY
[de-identified] : This patient is severe bilateral knee osteoarthritis.  The patient is not an appropriate candidate for surgical intervention at this time as her BMI is over our cutoff of 40 making surgery unsafe to proceed. An extensive discussion was conducted on the natural history of the disease and the variety of surgical and non-surgical options available to the patient including, but not limited to non-steroidal anti-inflammatory medications, steroid injections, physical therapy, maintenance of ideal body weight, and reduction of activity.  The patient has been counseled regarding the elevated risks associated with surgical complications in patients with a BMI>35.  I explained to the patient the risk of obesity, which is well documented in the orthopedic literature as increasing risks of wound breakdown (dehiscence), drainage, periprosthetic joint infection, dissatisfaction, chronic pain, early failure and/or loosening of the prosthesis, and impaired overall outcome to the patient. The patient demonstrates a profound understanding of the increased risk. Nutritionist referral, methods of monitoring nutrition intake and calorie counting and bariatric surgery options were discussed with the patient. After a lengthy discussion, the patient agreed to make a coordinated effort at weight loss. The patient understands our BMI policy and if they are planning surgical intervention, then they will need to bring their BMI below 40 in order to proceed and they are agreeable to this.  She should continue with the Mobic.  She does not need a new prescription for that today.  Today we performed bilateral knee intra-articular cortisone injections. The patient will schedule an appointment as needed in 3 months.   Informed consent for bilateral knee injections was obtained. All risks, benefits and alternatives were discussed. These included but were not limited to bleeding, infection, and allergic reaction.  All questions were answered. A time out was performed. The bilateral knees were prepped and draped in sterile fashion. Using sterile technique, the bilateral knees were each injected with 40mg of Kenalog, 4cc of 1% lidocaine, 4cc of 0.25% marcaine using a 21-gauge needle. A sterile dressing was applied. Post injection instructions were reviewed. The patient tolerated the procedure well.

## 2024-06-21 NOTE — HISTORY OF PRESENT ILLNESS
[de-identified] : This is very nice 63-year-old female experiencing bilateral knee pain, which is severe in intensity.  I have previously diagnosed with bilateral knee osteoarthritis.  Cortisone in the past has helped. The right knee is more painful than the left.  She has tried Mobic which does help.  Weight loss has not been tried.  She tried physical therapy which did not help.  She did try 1 round of hyaluronic acid injections which helped in the left knee only mildly.  Ambulates with a cane.  The patient denies any radiation of the pain to the feet and it is not associated with numbness, tingling, or weakness.  BMI is 58

## 2024-06-21 NOTE — PHYSICAL EXAM
[de-identified] : Patient is well nourished, well-developed, in no acute distress, with appropriate mood and affect. The patient is oriented to time, place, and person. Respirations are even and unlabored. Gait evaluation does reveal a limp. There is no inguinal adenopathy. Bilateral limbs are well-perfused, without skin lesions, shows a grossly normal motor and sensory examination. The right knee motion is significantly reduced and does cause significant pain. The right knee moves from 0 to 125 degrees. The knee is stable within that range-of-motion to AP and ML stress. The alignment of the knee is 5 degrees varus. Muscle strength is normal. Pedal pulses are palpable. Hip examination was negative. The left knee motion is significantly reduced and does cause significant pain. The left knee moves from 0 to 125 degrees. The knee is stable within that range-of-motion to AP and ML stress. The alignment of the knee is 5 degrees varus. Muscle strength is normal. Pedal pulses are palpable. Hip examination was negative.

## 2024-07-18 RX ORDER — CELECOXIB 100 MG/1
100 CAPSULE ORAL
Qty: 180 | Refills: 0 | Status: ACTIVE | COMMUNITY
Start: 2024-07-18 | End: 1900-01-01

## 2024-09-20 ENCOUNTER — APPOINTMENT (OUTPATIENT)
Dept: ORTHOPEDIC SURGERY | Facility: CLINIC | Age: 64
End: 2024-09-20
Payer: COMMERCIAL

## 2024-09-20 VITALS — BODY MASS INDEX: 47.65 KG/M2 | WEIGHT: 244 LBS

## 2024-09-20 VITALS — HEIGHT: 60 IN

## 2024-09-20 DIAGNOSIS — M17.0 BILATERAL PRIMARY OSTEOARTHRITIS OF KNEE: ICD-10-CM

## 2024-09-20 PROCEDURE — 99214 OFFICE O/P EST MOD 30 MIN: CPT | Mod: 25

## 2024-09-20 PROCEDURE — 73564 X-RAY EXAM KNEE 4 OR MORE: CPT | Mod: 50

## 2024-09-20 PROCEDURE — 20610 DRAIN/INJ JOINT/BURSA W/O US: CPT | Mod: 50

## 2024-09-20 NOTE — HISTORY OF PRESENT ILLNESS
[de-identified] : This is very nice 64-year-old female experiencing bilateral knee pain, which is severe in intensity.  I have previously diagnosed with bilateral knee osteoarthritis.  Cortisone in the past has helped. The right knee is more painful than the left.  She has tried Mobic which does help.  Weight loss has not been tried.  She tried physical therapy which did not help.  She did try 1 round of hyaluronic acid injections which helped in the left knee only mildly.  Ambulates with a cane.  The patient denies any radiation of the pain to the feet and it is not associated with numbness, tingling, or weakness.  BMI is 58

## 2024-09-20 NOTE — DISCUSSION/SUMMARY
Referred by: Negra Gordon MD; Medical Diagnosis (from order):    Diagnosis Information      Diagnosis    780.4 (ICD-9-CM) - R42 (ICD-10-CM) - Dizziness and giddiness                Daily Treatment Note    Visit:  4     SUBJECTIVE                                                                                                               Patient states overall dizziness is still elevated and walking has been more difficult, but definitely better since trying sustained cervical retraction as advised over the phone. Has appointment for 3 hour eye test in January    OBJECTIVE                                                                                                                        TREATMENT                                                                                                                  Therapeutic Exercise:  Baselines:   Cervical extension: mod loss 2/10 dizziness  Cervical R rotation: min loss, reports dizziness as \"everything is double\"  Cervical L rotation: min loss, 0/10 dizziness    Exercise:  Sustained cervical retraction x1 min - review for continued performance in HEP    Neuromuscular Re-Education:  Pencil push-ups in cervical R rotation x10 with 10 second hold  Saccades x30s standing feet shoulder width; x1 set horizontal/vertical  VOR x1 in standing, feet shoulder width; x1 set horizontal/vertical    DHT re-test:       L neg       R - mild, short duration R torsional upbeating nystagmus, Epley performed (see below)      R Epley -        Good response all positions, no increased dizziness afterward. Not re-tested     Skilled input: verbal instruction/cues and tactile instruction/cues    Writer verbally educated and received verbal consent for hand placement, positioning of patient, and techniques to be performed today from patient for clothing adjustments for techniques and therapist position for techniques as described above and how they are pertinent to the patient's plan of  [de-identified] : This patient is severe bilateral knee osteoarthritis.  The patient is not an appropriate candidate for surgical intervention at this time as her BMI is over our cutoff of 40 making surgery unsafe to proceed. An extensive discussion was conducted on the natural history of the disease and the variety of surgical and non-surgical options available to the patient including, but not limited to non-steroidal anti-inflammatory medications, steroid injections, physical therapy, maintenance of ideal body weight, and reduction of activity.  The patient has been counseled regarding the elevated risks associated with surgical complications in patients with a BMI>35.  I explained to the patient the risk of obesity, which is well documented in the orthopedic literature as increasing risks of wound breakdown (dehiscence), drainage, periprosthetic joint infection, dissatisfaction, chronic pain, early failure and/or loosening of the prosthesis, and impaired overall outcome to the patient. The patient demonstrates a profound understanding of the increased risk. Nutritionist referral, methods of monitoring nutrition intake and calorie counting and bariatric surgery options were discussed with the patient. After a lengthy discussion, the patient agreed to make a coordinated effort at weight loss. The patient understands our BMI policy and if they are planning surgical intervention, then they will need to bring their BMI below 40 in order to proceed and they are agreeable to this.  She should continue with the Mobic.  She does not need a new prescription for that today.  Today we performed bilateral knee intra-articular cortisone injections. The patient will schedule an appointment as needed in 3 months.   Informed consent for bilateral knee injections was obtained. All risks, benefits and alternatives were discussed. These included but were not limited to bleeding, infection, and allergic reaction.  All questions were answered. A time out was performed. The bilateral knees were prepped and draped in sterile fashion. Using sterile technique, the bilateral knees were each injected with 40mg of Kenalog, 4cc of 1% lidocaine, 4cc of 0.25% marcaine using a 21-gauge needle. A sterile dressing was applied. Post injection instructions were reviewed. The patient tolerated the procedure well.  care.    Home Exercise Program/Education Materials: Saccades in standing  VOR x1 in standing  Pencil pushups in R cervical rotation     ASSESSMENT                                                                                                             L DHT clear this session. Mild symptoms R DHT. R epley performed again, however will hold on self-repositioning for now. Reports improvement in dizziness with cervical extension and R rotation, however continues with dizziness described as \"double\" with turning to R. This improved following focused training of nearpoint convergence in R rotation. Updated HEP to include new position for pencil pushups and to progress ocular challenges into standing position.      GOALS                                                                                                                           Decrease intensity of dizziness to no greater than 3/10  Improve standing static balance with feet together to 30 seconds mod-I  The above improvements in impairments to assist in obtaining goals listed below  Long Term Goals: to be met by end of plan of care  1. Patient will  object from floor at mod-I level without UE support to improve ability to clean apartment.  2. Patient will turn head to R without report of blurred vision for improved safety in community ambulation.  3. Patient will ambulate 50ft without assistive device at mod-I level for improved safety and ease of household ambulation.  4. Patient will be independent with progressed and modified home exercise program.  5. Improve score on Rodriguez Balance Scale to 40/56 to demonstrate a decrease in risk for falls in household mobility.      Therapy procedure time and total treatment time can be found documented on the Time Entry flowsheet

## 2024-09-20 NOTE — PHYSICAL EXAM
[de-identified] : Patient is well nourished, well-developed, in no acute distress, with appropriate mood and affect. The patient is oriented to time, place, and person. Respirations are even and unlabored. Gait evaluation does reveal a limp. There is no inguinal adenopathy. Bilateral limbs are well-perfused, without skin lesions, shows a grossly normal motor and sensory examination. The right knee motion is significantly reduced and does cause significant pain. The right knee moves from 0 to 125 degrees. The knee is stable within that range-of-motion to AP and ML stress. The alignment of the knee is 5 degrees varus. Muscle strength is normal. Pedal pulses are palpable. Hip examination was negative. The left knee motion is significantly reduced and does cause significant pain. The left knee moves from 0 to 125 degrees. The knee is stable within that range-of-motion to AP and ML stress. The alignment of the knee is 5 degrees varus. Muscle strength is normal. Pedal pulses are palpable. Hip examination was negative. [de-identified] : Long standing knee, AP knee, lateral knee, tunnel and patellar views of the both knees were ordered and taken in the office and demonstrate degenerative joint disease of the knee with joint space narrowing, osteophyte formation, and subchondral sclerosis.

## 2024-10-18 RX ORDER — CELECOXIB 100 MG/1
100 CAPSULE ORAL
Qty: 60 | Refills: 2 | Status: ACTIVE | COMMUNITY
Start: 2024-10-18 | End: 1900-01-01

## 2024-12-02 ENCOUNTER — APPOINTMENT (OUTPATIENT)
Dept: NEUROLOGY | Facility: CLINIC | Age: 64
End: 2024-12-02
Payer: COMMERCIAL

## 2024-12-02 VITALS
BODY MASS INDEX: 47.32 KG/M2 | HEART RATE: 82 BPM | DIASTOLIC BLOOD PRESSURE: 76 MMHG | OXYGEN SATURATION: 96 % | HEIGHT: 60 IN | SYSTOLIC BLOOD PRESSURE: 167 MMHG | WEIGHT: 241 LBS

## 2024-12-02 DIAGNOSIS — R25.1 TREMOR, UNSPECIFIED: ICD-10-CM

## 2024-12-02 PROCEDURE — 99204 OFFICE O/P NEW MOD 45 MIN: CPT

## 2024-12-02 PROCEDURE — G2211 COMPLEX E/M VISIT ADD ON: CPT | Mod: NC

## 2024-12-13 ENCOUNTER — APPOINTMENT (OUTPATIENT)
Dept: ORTHOPEDIC SURGERY | Facility: CLINIC | Age: 64
End: 2024-12-13
Payer: COMMERCIAL

## 2024-12-13 VITALS — WEIGHT: 241 LBS | HEIGHT: 60 IN | BODY MASS INDEX: 47.32 KG/M2

## 2024-12-13 DIAGNOSIS — M17.11 UNILATERAL PRIMARY OSTEOARTHRITIS, RIGHT KNEE: ICD-10-CM

## 2024-12-13 DIAGNOSIS — M17.9 OSTEOARTHRITIS OF KNEE, UNSPECIFIED: ICD-10-CM

## 2024-12-13 DIAGNOSIS — M17.0 BILATERAL PRIMARY OSTEOARTHRITIS OF KNEE: ICD-10-CM

## 2024-12-13 DIAGNOSIS — M17.12 UNILATERAL PRIMARY OSTEOARTHRITIS, LEFT KNEE: ICD-10-CM

## 2024-12-13 PROCEDURE — 99214 OFFICE O/P EST MOD 30 MIN: CPT | Mod: 25

## 2024-12-13 PROCEDURE — 20610 DRAIN/INJ JOINT/BURSA W/O US: CPT | Mod: 50

## 2024-12-13 RX ORDER — CELECOXIB 200 MG/1
200 CAPSULE ORAL
Qty: 30 | Refills: 2 | Status: ACTIVE | COMMUNITY
Start: 2024-12-13 | End: 1900-01-01

## 2025-03-14 ENCOUNTER — APPOINTMENT (OUTPATIENT)
Dept: ORTHOPEDIC SURGERY | Facility: CLINIC | Age: 65
End: 2025-03-14
Payer: COMMERCIAL

## 2025-03-14 VITALS — BODY MASS INDEX: 47.32 KG/M2 | HEIGHT: 60 IN | WEIGHT: 241 LBS

## 2025-03-14 DIAGNOSIS — M17.0 BILATERAL PRIMARY OSTEOARTHRITIS OF KNEE: ICD-10-CM

## 2025-03-14 PROCEDURE — 20610 DRAIN/INJ JOINT/BURSA W/O US: CPT | Mod: 50

## 2025-03-14 PROCEDURE — 99214 OFFICE O/P EST MOD 30 MIN: CPT | Mod: 25

## 2025-03-14 RX ORDER — CELECOXIB 200 MG/1
200 CAPSULE ORAL
Qty: 60 | Refills: 1 | Status: ACTIVE | COMMUNITY
Start: 2025-03-14 | End: 1900-01-01

## 2025-06-20 ENCOUNTER — APPOINTMENT (OUTPATIENT)
Dept: ORTHOPEDIC SURGERY | Facility: CLINIC | Age: 65
End: 2025-06-20
Payer: COMMERCIAL

## 2025-06-20 VITALS — BODY MASS INDEX: 45.16 KG/M2 | HEIGHT: 60 IN | WEIGHT: 230 LBS

## 2025-06-20 PROCEDURE — 73564 X-RAY EXAM KNEE 4 OR MORE: CPT | Mod: 50

## 2025-06-20 PROCEDURE — 99214 OFFICE O/P EST MOD 30 MIN: CPT | Mod: 25

## 2025-06-20 PROCEDURE — 20610 DRAIN/INJ JOINT/BURSA W/O US: CPT | Mod: 50

## 2025-09-18 ENCOUNTER — NON-APPOINTMENT (OUTPATIENT)
Age: 65
End: 2025-09-18

## 2025-09-19 ENCOUNTER — APPOINTMENT (OUTPATIENT)
Dept: ORTHOPEDIC SURGERY | Facility: CLINIC | Age: 65
End: 2025-09-19
Payer: COMMERCIAL

## 2025-09-19 VITALS — WEIGHT: 230 LBS | HEIGHT: 60 IN | BODY MASS INDEX: 45.16 KG/M2

## 2025-09-19 DIAGNOSIS — M17.0 BILATERAL PRIMARY OSTEOARTHRITIS OF KNEE: ICD-10-CM

## 2025-09-19 PROCEDURE — 99214 OFFICE O/P EST MOD 30 MIN: CPT | Mod: 25

## 2025-09-19 PROCEDURE — 20610 DRAIN/INJ JOINT/BURSA W/O US: CPT | Mod: 50

## 2025-09-20 ENCOUNTER — TRANSCRIPTION ENCOUNTER (OUTPATIENT)
Age: 65
End: 2025-09-20

## 2025-09-20 RX ORDER — CELECOXIB 200 MG/1
200 CAPSULE ORAL
Qty: 30 | Refills: 2 | Status: ACTIVE | COMMUNITY
Start: 2025-09-20 | End: 1900-01-01